# Patient Record
Sex: MALE | Race: WHITE | NOT HISPANIC OR LATINO | Employment: OTHER | URBAN - METROPOLITAN AREA
[De-identification: names, ages, dates, MRNs, and addresses within clinical notes are randomized per-mention and may not be internally consistent; named-entity substitution may affect disease eponyms.]

---

## 2018-12-14 NOTE — PATIENT DISCUSSION
TRAUMATIC IRITIS,  OS: PRESCRIBE PRED FORTE QID x 1 week (GAVE SAMPLE OF DUREZOL TO START). TRAUMATIC IN NATURE 2* [employer] B[employer] DEPL[employer]MENT. FOLLOW-UP AS SCHEDULED/SOONER IF SYMPTOMS INCREASE/PERSIST.

## 2018-12-14 NOTE — PATIENT DISCUSSION
New Prescription: Pred Forte (prednisolone acetate): drops,suspension: 1% 1 drop four times a day into left eye 12-

## 2018-12-28 NOTE — PATIENT DISCUSSION
Continue: Pred Forte (prednisolone acetate): drops,suspension: 1% 1 drop four times a day into affected eye

## 2018-12-28 NOTE — PATIENT DISCUSSION
IRITIS OS: RESOLVED. TAPER PRED FORTE TO BID x 1 WEEK THEN QD x 1 WEEK. STILL NOTING GLIMMERS OF LIGHT 1-2 x/day OS- NO BREAKS OR TEARS NOTED. RTC TO SEE RETINA IF FLASHES INCREASE/NEW FLOATERS OR DARK CURTAIN OVER VA.

## 2019-11-27 NOTE — PATIENT DISCUSSION
Stopped Today: Pred Forte (prednisolone acetate): drops,suspension: 1% 1 drop four times a day into affected eye

## 2019-12-19 ENCOUNTER — NEW PATIENT COMPREHENSIVE (OUTPATIENT)
Dept: URBAN - METROPOLITAN AREA CLINIC 32 | Facility: CLINIC | Age: 49
End: 2019-12-19

## 2019-12-19 DIAGNOSIS — H16.223: ICD-10-CM

## 2019-12-19 DIAGNOSIS — H35.371: ICD-10-CM

## 2019-12-19 PROCEDURE — 92015 DETERMINE REFRACTIVE STATE: CPT

## 2019-12-19 PROCEDURE — 92134 CPTRZ OPH DX IMG PST SGM RTA: CPT

## 2019-12-19 PROCEDURE — 92004 COMPRE OPH EXAM NEW PT 1/>: CPT

## 2019-12-19 ASSESSMENT — KERATOMETRY
OD_K1POWER_DIOPTERS: 52
OD_AXISANGLE_DEGREES: 42
OS_K1POWER_DIOPTERS: 49.75
OS_AXISANGLE_DEGREES: 135
OS_K2POWER_DIOPTERS: 48.75
OD_K2POWER_DIOPTERS: 48.75
OS_AXISANGLE2_DEGREES: 45
OD_AXISANGLE2_DEGREES: 132

## 2019-12-19 ASSESSMENT — VISUAL ACUITY
OD_BAT: 20/100
OS_BAT: 20/200
OU_SC: 20/30-2
OD_SC: 20/25-2
OS_SC: 20/40-1

## 2019-12-19 ASSESSMENT — TONOMETRY
OD_IOP_MMHG: 11
OS_IOP_MMHG: 15

## 2022-07-30 ENCOUNTER — TELEPHONE ENCOUNTER (OUTPATIENT)
Age: 52
End: 2022-07-30

## 2022-07-31 ENCOUNTER — TELEPHONE ENCOUNTER (OUTPATIENT)
Age: 52
End: 2022-07-31